# Patient Record
Sex: FEMALE | Race: WHITE | NOT HISPANIC OR LATINO
[De-identification: names, ages, dates, MRNs, and addresses within clinical notes are randomized per-mention and may not be internally consistent; named-entity substitution may affect disease eponyms.]

---

## 2017-01-16 ENCOUNTER — RESULT CHARGE (OUTPATIENT)
Age: 52
End: 2017-01-16

## 2017-01-17 ENCOUNTER — APPOINTMENT (OUTPATIENT)
Dept: ENDOCRINOLOGY | Facility: CLINIC | Age: 52
End: 2017-01-17

## 2017-01-17 VITALS
SYSTOLIC BLOOD PRESSURE: 140 MMHG | DIASTOLIC BLOOD PRESSURE: 78 MMHG | OXYGEN SATURATION: 97 % | HEIGHT: 68 IN | BODY MASS INDEX: 44.41 KG/M2 | HEART RATE: 79 BPM | WEIGHT: 293 LBS

## 2017-01-17 DIAGNOSIS — Z82.49 FAMILY HISTORY OF ISCHEMIC HEART DISEASE AND OTHER DISEASES OF THE CIRCULATORY SYSTEM: ICD-10-CM

## 2017-01-17 DIAGNOSIS — Z83.3 FAMILY HISTORY OF DIABETES MELLITUS: ICD-10-CM

## 2017-01-17 DIAGNOSIS — Z82.61 FAMILY HISTORY OF ARTHRITIS: ICD-10-CM

## 2017-01-17 DIAGNOSIS — F17.200 NICOTINE DEPENDENCE, UNSPECIFIED, UNCOMPLICATED: ICD-10-CM

## 2017-01-17 DIAGNOSIS — Z80.0 FAMILY HISTORY OF MALIGNANT NEOPLASM OF DIGESTIVE ORGANS: ICD-10-CM

## 2017-01-17 LAB — GLUCOSE BLDC GLUCOMTR-MCNC: 138

## 2017-01-18 LAB
ANION GAP SERPL CALC-SCNC: 17 MMOL/L
BUN SERPL-MCNC: 18 MG/DL
CALCIUM SERPL-MCNC: 9.8 MG/DL
CHLORIDE SERPL-SCNC: 96 MMOL/L
CHOLEST SERPL-MCNC: 234 MG/DL
CHOLEST/HDLC SERPL: 5.4 RATIO
CO2 SERPL-SCNC: 21 MMOL/L
CREAT SERPL-MCNC: 0.57 MG/DL
GLUCOSE SERPL-MCNC: 142 MG/DL
HDLC SERPL-MCNC: 43 MG/DL
LDLC SERPL CALC-MCNC: 114 MG/DL
POTASSIUM SERPL-SCNC: 4.6 MMOL/L
SODIUM SERPL-SCNC: 134 MMOL/L
TRIGL SERPL-MCNC: 384 MG/DL
TSH SERPL-ACNC: 3.4 UIU/ML

## 2017-01-19 LAB — HBA1C MFR BLD HPLC: 7.7

## 2017-02-06 ENCOUNTER — RESULT REVIEW (OUTPATIENT)
Age: 52
End: 2017-02-06

## 2017-02-10 ENCOUNTER — APPOINTMENT (OUTPATIENT)
Dept: OBGYN | Facility: CLINIC | Age: 52
End: 2017-02-10

## 2017-04-12 ENCOUNTER — APPOINTMENT (OUTPATIENT)
Dept: ENDOCRINOLOGY | Facility: CLINIC | Age: 52
End: 2017-04-12

## 2017-04-19 ENCOUNTER — APPOINTMENT (OUTPATIENT)
Dept: ENDOCRINOLOGY | Facility: CLINIC | Age: 52
End: 2017-04-19

## 2018-03-15 ENCOUNTER — ASOB RESULT (OUTPATIENT)
Age: 53
End: 2018-03-15

## 2018-03-15 ENCOUNTER — APPOINTMENT (OUTPATIENT)
Dept: OBGYN | Facility: CLINIC | Age: 53
End: 2018-03-15
Payer: COMMERCIAL

## 2018-03-15 PROCEDURE — 76830 TRANSVAGINAL US NON-OB: CPT

## 2018-05-24 ENCOUNTER — RESULT REVIEW (OUTPATIENT)
Age: 53
End: 2018-05-24

## 2019-02-25 ENCOUNTER — APPOINTMENT (OUTPATIENT)
Dept: SPINE | Facility: CLINIC | Age: 54
End: 2019-02-25
Payer: OTHER MISCELLANEOUS

## 2019-02-25 VITALS
HEART RATE: 92 BPM | WEIGHT: 273 LBS | HEIGHT: 68 IN | OXYGEN SATURATION: 95 % | BODY MASS INDEX: 41.37 KG/M2 | DIASTOLIC BLOOD PRESSURE: 73 MMHG | SYSTOLIC BLOOD PRESSURE: 115 MMHG | RESPIRATION RATE: 15 BRPM

## 2019-02-25 DIAGNOSIS — M54.16 RADICULOPATHY, LUMBAR REGION: ICD-10-CM

## 2019-02-25 DIAGNOSIS — Z83.49 FAMILY HISTORY OF OTHER ENDOCRINE, NUTRITIONAL AND METABOLIC DISEASES: ICD-10-CM

## 2019-02-25 DIAGNOSIS — Z87.39 PERSONAL HISTORY OF OTHER DISEASES OF THE MUSCULOSKELETAL SYSTEM AND CONNECTIVE TISSUE: ICD-10-CM

## 2019-02-25 DIAGNOSIS — M54.9 DORSALGIA, UNSPECIFIED: ICD-10-CM

## 2019-02-25 PROCEDURE — 99203 OFFICE O/P NEW LOW 30 MIN: CPT

## 2019-02-25 RX ORDER — PSYLLIUM HUSK 0.4 G
CAPSULE ORAL
Refills: 0 | Status: ACTIVE | COMMUNITY

## 2019-02-25 RX ORDER — CALCIUM CARBONATE 260MG(650)
TABLET,CHEWABLE ORAL
Refills: 0 | Status: ACTIVE | COMMUNITY

## 2019-02-25 RX ORDER — OMEGA-3/DHA/EPA/FISH OIL 300-1000MG
CAPSULE ORAL
Refills: 0 | Status: ACTIVE | COMMUNITY

## 2019-02-25 RX ORDER — ATORVASTATIN CALCIUM 10 MG/1
10 TABLET, FILM COATED ORAL
Refills: 0 | Status: ACTIVE | COMMUNITY

## 2019-02-25 RX ORDER — MULTIVIT-MIN/IRON/FOLIC ACID/K 18-600-40
500 CAPSULE ORAL
Refills: 0 | Status: ACTIVE | COMMUNITY

## 2019-02-25 RX ORDER — BENZOCAINE/BENZALKONIUM/ALOE/E 5 %-0.13 %
CREAM (GRAM) TOPICAL
Refills: 0 | Status: ACTIVE | COMMUNITY

## 2019-02-25 NOTE — REASON FOR VISIT
[New Patient Visit] : a new patient visit [Referred By: _________] : Patient was referred by ANA ROSA [FreeTextEntry1] : Patient reports many years of low back pain

## 2019-02-25 NOTE — HISTORY OF PRESENT ILLNESS
[> 3 months] : more  than 3 months [FreeTextEntry1] : back and leg pain [de-identified] : 53-year-old woman who was originally injured at work many years ago. She has an MRI from 2012 showed a small central L5-S1 disc herniation without significant neural impingement.  She has been undergoing chiropractic treatment with some improvement. She is also been taking Motrin but this recently up to her stomach so she is not taking. Recent lumbar spine x-rays show mild degenerative changes no problems with alignment. She does not describe any weakness, bowel or bladder dysfunction.Her BMI is 41 and she has recently lost 24pounds.

## 2019-02-25 NOTE — ASSESSMENT
[FreeTextEntry1] : Work-related injury resulting in chronic back and leg pain. Normal neurological exam. Recommend physical therapy and chiropractic treatment. Return in 2 months. Also recommend continued weight loss. No need for any surgical intervention at the present time.

## 2019-02-25 NOTE — CONSULT LETTER
[Dear  ___] : Dear  [unfilled], [Consult Letter:] : I had the pleasure of evaluating your patient, [unfilled]. [Please see my note below.] : Please see my note below. [Consult Closing:] : Thank you very much for allowing me to participate in the care of this patient.  If you have any questions, please do not hesitate to contact me. [Sincerely,] : Sincerely, [FreeTextEntry3] : Hank Sheets MD\par Chief of Neurosurgery [facility]\par [facility]\par

## 2019-02-25 NOTE — PHYSICAL EXAM
[Person] : oriented to person [Place] : oriented to place [Time] : oriented to time [Short Term Intact] : short term memory intact [Remote Intact] : remote memory intact [Span Intact] : the attention span was normal [Concentration Intact] : normal concentrating ability [Fluency] : fluency intact [Comprehension] : comprehension intact [Current Events] : adequate knowledge of current events [Past History] : adequate knowledge of personal past history [Vocabulary] : adequate range of vocabulary [Cranial Nerves Optic (II)] : visual acuity intact bilaterally,  pupils equal round and reactive to light [Cranial Nerves Oculomotor (III)] : extraocular motion intact [Cranial Nerves Trigeminal (V)] : facial sensation intact symmetrically [Cranial Nerves Facial (VII)] : face symmetrical [Cranial Nerves Vestibulocochlear (VIII)] : hearing was intact bilaterally [Cranial Nerves Glossopharyngeal (IX)] : tongue and palate midline [Cranial Nerves Accessory (XI - Cranial And Spinal)] : head turning and shoulder shrug symmetric [Cranial Nerves Hypoglossal (XII)] : there was no tongue deviation with protrusion [Motor Tone] : muscle tone was normal in all four extremities [Motor Strength] : muscle strength was normal in all four extremities [No Muscle Atrophy] : normal bulk in all four extremities [Sensation Tactile Decrease] : light touch was intact [Abnormal Walk] : normal gait [Balance] : balance was intact [Past-pointing] : there was no past-pointing [Tremor] : no tremor present [2+] : Ankle jerk left 2+ [Plantar Reflex Right Only] : normal on the right [Plantar Reflex Left Only] : normal on the left [Winter] : Winter's sign was not demonstrated [No Tenderness to Palpation] : no spine tenderness on palpation [Straight-Leg Raise Test - Left] : straight leg raise of the left leg was negative [Straight-Leg Raise Test - Right] : straight leg raise  of the right leg was negative

## 2019-06-05 ENCOUNTER — APPOINTMENT (OUTPATIENT)
Dept: ENDOCRINOLOGY | Facility: CLINIC | Age: 54
End: 2019-06-05
Payer: COMMERCIAL

## 2019-06-05 VITALS
TEMPERATURE: 98.5 F | OXYGEN SATURATION: 98 % | BODY MASS INDEX: 43.5 KG/M2 | SYSTOLIC BLOOD PRESSURE: 115 MMHG | HEIGHT: 68 IN | DIASTOLIC BLOOD PRESSURE: 80 MMHG | WEIGHT: 287 LBS | HEART RATE: 96 BPM

## 2019-06-05 DIAGNOSIS — E11.628 TYPE 2 DIABETES MELLITUS WITH OTHER SKIN COMPLICATIONS: ICD-10-CM

## 2019-06-05 PROCEDURE — 99204 OFFICE O/P NEW MOD 45 MIN: CPT

## 2019-06-05 PROCEDURE — 82962 GLUCOSE BLOOD TEST: CPT

## 2019-06-05 PROCEDURE — 83036 HEMOGLOBIN GLYCOSYLATED A1C: CPT | Mod: QW

## 2019-06-06 ENCOUNTER — RX RENEWAL (OUTPATIENT)
Age: 54
End: 2019-06-06

## 2019-06-17 LAB
GLUCOSE BLDC GLUCOMTR-MCNC: 239
HBA1C MFR BLD HPLC: 8.7

## 2019-07-01 ENCOUNTER — APPOINTMENT (OUTPATIENT)
Dept: ENDOCRINOLOGY | Facility: CLINIC | Age: 54
End: 2019-07-01
Payer: COMMERCIAL

## 2019-07-01 VITALS
TEMPERATURE: 98.7 F | HEART RATE: 99 BPM | OXYGEN SATURATION: 96 % | DIASTOLIC BLOOD PRESSURE: 70 MMHG | WEIGHT: 285 LBS | BODY MASS INDEX: 43.19 KG/M2 | HEIGHT: 68 IN | SYSTOLIC BLOOD PRESSURE: 118 MMHG

## 2019-07-01 PROCEDURE — 82962 GLUCOSE BLOOD TEST: CPT

## 2019-07-01 PROCEDURE — 99214 OFFICE O/P EST MOD 30 MIN: CPT

## 2019-07-02 RX ORDER — FLASH GLUCOSE SENSOR
KIT MISCELLANEOUS
Qty: 6 | Refills: 3 | Status: ACTIVE | COMMUNITY
Start: 2019-07-01 | End: 1900-01-01

## 2019-07-02 RX ORDER — PHENTERMINE HYDROCHLORIDE 15 MG/1
15 CAPSULE ORAL DAILY
Qty: 30 | Refills: 0 | Status: DISCONTINUED | COMMUNITY
Start: 2019-06-05 | End: 2019-07-02

## 2019-07-03 RX ORDER — FLASH GLUCOSE SCANNING READER
EACH MISCELLANEOUS
Qty: 1 | Refills: 0 | Status: ACTIVE | COMMUNITY
Start: 2019-07-01 | End: 1900-01-01

## 2019-07-24 NOTE — PHYSICAL EXAM
[Alert] : alert [Well Nourished] : well nourished [No Acute Distress] : no acute distress [Well Developed] : well developed [Normal Sclera/Conjunctiva] : normal sclera/conjunctiva [No Proptosis] : no proptosis [EOMI] : extra ocular movement intact [Normal Oropharynx] : the oropharynx was normal [Thyroid Not Enlarged] : the thyroid was not enlarged [No Thyroid Nodules] : there were no palpable thyroid nodules [No Respiratory Distress] : no respiratory distress [No Accessory Muscle Use] : no accessory muscle use [Clear to Auscultation] : lungs were clear to auscultation bilaterally [Normal S1, S2] : normal S1 and S2 [Normal Rate] : heart rate was normal  [Regular Rhythm] : with a regular rhythm [Pedal Pulses Normal] : the pedal pulses are present [No Edema] : there was no peripheral edema [Normal Bowel Sounds] : normal bowel sounds [Not Tender] : non-tender [Not Distended] : not distended [Soft] : abdomen soft [Post Cervical Nodes] : posterior cervical nodes [Anterior Cervical Nodes] : anterior cervical nodes [Axillary Nodes] : axillary nodes [No Spinal Tenderness] : no spinal tenderness [Normal] : normal and non tender [Normal Gait] : normal gait [Spine Straight] : spine straight [No Stigmata of Cushings Syndrome] : no stigmata of cushings syndrome [No Rash] : no rash [Normal Strength/Tone] : muscle strength and tone were normal [Normal Reflexes] : deep tendon reflexes were 2+ and symmetric [No Tremors] : no tremors [Oriented x3] : oriented to person, place, and time [Acanthosis Nigricans] : no acanthosis nigricans

## 2019-07-24 NOTE — HISTORY OF PRESENT ILLNESS
[FreeTextEntry1] : Ms. Lopez is a  53  year-old female who presents for endocrine evaluation with regard to a hx of type 2 dm. The diabetes has been present for about 4-5   years.  . She denies any history of neuropathy or nephropathy. With regard to neuropathy, she denies any s/s. . For the diabetes, she is currently taking Metfromin Wer 750 mg 2 per day Recently admits to non compliance with diet and has not been testing .Did have charles but needs someone to help her put it on.  She denies polyuria, polydipsia, or any visual changes. She too denies any skin lesions, skin breakdown or non-healing areas of skin. She further denies any podiatric concerns. Ophthalmologic evaluation is up to date. A1c values over last several yrs have been in th e mid  to upper 7 range. Home glucose monitoring has shown values in am to be   and values   .  She does deny any hypoglycemia or hypoglycemic signs or symptoms.\par She is on Lisinopril 10 mg and Lipitor 10 mg. The Lisinopril is said to be for renal protection. Has considered bariatric surgery but doesn’t want to go forward.\par Had been on Tanzeum injection in the past.\par Does want to consider appetite suppressant.\par Recent lasb with A1c 8.6 and Ttrig 436. and vit d wqw 17\par Too, had protein in urine on u/a.  nahidlows with Dr. Cain nephrologist.\par \par

## 2019-07-25 ENCOUNTER — APPOINTMENT (OUTPATIENT)
Dept: OTOLARYNGOLOGY | Facility: CLINIC | Age: 54
End: 2019-07-25
Payer: COMMERCIAL

## 2019-07-25 VITALS
HEART RATE: 101 BPM | DIASTOLIC BLOOD PRESSURE: 70 MMHG | BODY MASS INDEX: 43.19 KG/M2 | HEIGHT: 68 IN | SYSTOLIC BLOOD PRESSURE: 102 MMHG | WEIGHT: 285 LBS

## 2019-07-25 DIAGNOSIS — Z86.79 PERSONAL HISTORY OF OTHER DISEASES OF THE CIRCULATORY SYSTEM: ICD-10-CM

## 2019-07-25 DIAGNOSIS — R42 DIZZINESS AND GIDDINESS: ICD-10-CM

## 2019-07-25 DIAGNOSIS — R51 HEADACHE: ICD-10-CM

## 2019-07-25 DIAGNOSIS — Z86.39 PERSONAL HISTORY OF OTHER ENDOCRINE, NUTRITIONAL AND METABOLIC DISEASE: ICD-10-CM

## 2019-07-25 PROCEDURE — 92557 COMPREHENSIVE HEARING TEST: CPT

## 2019-07-25 PROCEDURE — 92567 TYMPANOMETRY: CPT

## 2019-07-25 PROCEDURE — 99204 OFFICE O/P NEW MOD 45 MIN: CPT | Mod: 25

## 2019-07-25 NOTE — REVIEW OF SYSTEMS
[Sneezing] : sneezing [Seasonal Allergies] : seasonal allergies [Lightheadedness] : lightheadedness [Problem Snoring] : problem snoring [Snoring With Pauses] : snoring with pauses [Red Eyes] : red eyes [Joint Pain] : joint pain [Negative] : Heme/Lymph

## 2019-07-26 PROBLEM — R51 HEADACHE: Status: ACTIVE | Noted: 2019-07-26

## 2019-07-26 NOTE — REASON FOR VISIT
[Initial Evaluation] : an initial evaluation for [FreeTextEntry2] : patient complaint of going off balance and light headache times 3 weeks ago

## 2019-07-26 NOTE — PHYSICAL EXAM
[Hearing Loss Right Only] : normal [Hearing Loss Left Only] : normal [Rinne Test Air Conduction Persists > Bone Conduction Right] : air conduction greater than bone conduction on the right [Rinne Test Air Conduction Persists > Bone Conduction Left] : air conduction greater than bone conduction on the left [Hearing Tavares Test (Tuning Fork On Forehead)] : no lateralization of tone [Nystagmus] : ~T no ~M nystagmus was seen [Fukuda Step Test] : Fukuda Step Test was Negative [Romberg's Sign] : Romberg's sign was absent [Fistula Sign] : Fistula Sign: Negative [Past-Pointing] : Past-Pointing: Negative [Tiffanie-Halldayanake] : Selah-Hallpike: Negative [de-identified] : neg head thrust [Midline] : trachea located in midline position [Normal] : no rashes

## 2019-07-26 NOTE — HISTORY OF PRESENT ILLNESS
[de-identified] : 52yo woman with episodic vertigo\par describes as the whole world moving\par or feels like she is moving\par less than a minute\par occurs with standing, sitting, moving, any time\par 3-4 times per week\par going on for several months\par she is in menopause\par history of migraines\par +motion sickness\par +fam h/o migraines\par was taking an mg supplement, stopped at time sx started\par no problems with hearing

## 2019-07-28 LAB
CREAT SPEC-SCNC: 74 MG/DL
GLUCOSE BLDC GLUCOMTR-MCNC: 177
MICROALBUMIN 24H UR DL<=1MG/L-MCNC: 99.1 MG/DL
MICROALBUMIN/CREAT 24H UR-RTO: 1342 MG/G

## 2019-07-28 NOTE — PHYSICAL EXAM
[Alert] : alert [No Acute Distress] : no acute distress [Well Nourished] : well nourished [Well Developed] : well developed [Normal Sclera/Conjunctiva] : normal sclera/conjunctiva [EOMI] : extra ocular movement intact [Normal Oropharynx] : the oropharynx was normal [No Proptosis] : no proptosis [Thyroid Not Enlarged] : the thyroid was not enlarged [No Respiratory Distress] : no respiratory distress [No Thyroid Nodules] : there were no palpable thyroid nodules [No Accessory Muscle Use] : no accessory muscle use [Clear to Auscultation] : lungs were clear to auscultation bilaterally [Normal Rate] : heart rate was normal  [Normal S1, S2] : normal S1 and S2 [Regular Rhythm] : with a regular rhythm [Pedal Pulses Normal] : the pedal pulses are present [No Edema] : there was no peripheral edema [Normal Bowel Sounds] : normal bowel sounds [Not Tender] : non-tender [Soft] : abdomen soft [Not Distended] : not distended [Post Cervical Nodes] : posterior cervical nodes [Anterior Cervical Nodes] : anterior cervical nodes [Normal] : normal and non tender [Axillary Nodes] : axillary nodes [No Spinal Tenderness] : no spinal tenderness [Spine Straight] : spine straight [No Stigmata of Cushings Syndrome] : no stigmata of cushings syndrome [Normal Gait] : normal gait [Normal Strength/Tone] : muscle strength and tone were normal [No Rash] : no rash [No Tremors] : no tremors [Normal Reflexes] : deep tendon reflexes were 2+ and symmetric [Oriented x3] : oriented to person, place, and time [Acanthosis Nigricans] : no acanthosis nigricans

## 2019-07-28 NOTE — HISTORY OF PRESENT ILLNESS
[FreeTextEntry1] : Ms. Lopez is a  53  year-old female who presents for endocrine evaluation with regard to a hx of type 2 dm. The diabetes has been present for about    years.  The diabetes has been present for about 4-5 yrs. She denies any history of neuropathy or nephropathy. With regard to neuropathy, she denies any s/s. . For the diabetes, she is currently taking janument 50/1000 0mg  p[er Dr. Corrales  and is off metformin  too on phentermine  was taking 2 per day total of 30 nmg  2 lb wt loss.\par She took the phentermine but did not notice  any change in her appetite.\par Very stressed of late.\par Recent,admits to non compliance with diet and has not been testing .Did have charles but needs someone to help her put it on.  She denies polyuria, polydipsia, or any visual changes. She too denies any skin lesions, skin breakdown or non-healing areas of skin. She further denies any podiatric concerns. Ophthalmologic evaluation is up to date. A1c values over last several yrs have been in the  mid  to upper 7 range. Home glucose monitoring has shown values in am to be in mid  to low 100's to low 200's  She does deny any hypoglycemia or hypoglycemic signs or symptoms.\par She is on Lisinopril 10 mg and Lipitor 10 mg. The Lisinopril is said to be for renal protection. Has considered bariatric surgery but doesn’t want to go forward.\par Had been on Tanzeum injection in the past.\par Does want to consider appetite suppressant.\par Recent labs with A1c 8.6 and Ttrig 436. and vit d at 17\par Too, had protein in urine on u/a.  follows with Dr. Chaidez  nephrologist.\par \par

## 2019-08-02 ENCOUNTER — APPOINTMENT (OUTPATIENT)
Dept: ENDOCRINOLOGY | Facility: CLINIC | Age: 54
End: 2019-08-02
Payer: COMMERCIAL

## 2019-08-12 ENCOUNTER — APPOINTMENT (OUTPATIENT)
Dept: ENDOCRINOLOGY | Facility: CLINIC | Age: 54
End: 2019-08-12
Payer: COMMERCIAL

## 2019-08-12 VITALS
TEMPERATURE: 98.5 F | HEART RATE: 98 BPM | OXYGEN SATURATION: 95 % | BODY MASS INDEX: 43.19 KG/M2 | SYSTOLIC BLOOD PRESSURE: 120 MMHG | WEIGHT: 285 LBS | HEIGHT: 68 IN | DIASTOLIC BLOOD PRESSURE: 84 MMHG

## 2019-08-12 VITALS
HEIGHT: 68 IN | TEMPERATURE: 98.5 F | BODY MASS INDEX: 42.74 KG/M2 | RESPIRATION RATE: 15 BRPM | DIASTOLIC BLOOD PRESSURE: 84 MMHG | HEART RATE: 98 BPM | SYSTOLIC BLOOD PRESSURE: 120 MMHG | WEIGHT: 282 LBS

## 2019-08-12 LAB — GLUCOSE BLDC GLUCOMTR-MCNC: 178

## 2019-08-12 PROCEDURE — 82962 GLUCOSE BLOOD TEST: CPT

## 2019-08-12 PROCEDURE — 99215 OFFICE O/P EST HI 40 MIN: CPT

## 2019-08-12 RX ORDER — METFORMIN HYDROCHLORIDE 625 MG/1
TABLET ORAL
Refills: 0 | Status: DISCONTINUED | COMMUNITY
End: 2019-08-12

## 2019-08-12 RX ORDER — ALBIGLUTIDE 30 MG/.5ML
30 INJECTION, POWDER, LYOPHILIZED, FOR SOLUTION SUBCUTANEOUS
Qty: 1 | Refills: 0 | Status: DISCONTINUED | COMMUNITY
Start: 2017-01-17 | End: 2019-08-12

## 2019-08-12 RX ORDER — LANCETS
EACH MISCELLANEOUS
Qty: 3 | Refills: 3 | Status: ACTIVE | COMMUNITY
Start: 2019-08-12 | End: 1900-01-01

## 2019-08-12 NOTE — PHYSICAL EXAM
[Alert] : alert [No Acute Distress] : no acute distress [Well Nourished] : well nourished [Well Developed] : well developed [Normal Sclera/Conjunctiva] : normal sclera/conjunctiva [EOMI] : extra ocular movement intact [No Proptosis] : no proptosis [Thyroid Not Enlarged] : the thyroid was not enlarged [Normal Oropharynx] : the oropharynx was normal [No Thyroid Nodules] : there were no palpable thyroid nodules [No Respiratory Distress] : no respiratory distress [No Accessory Muscle Use] : no accessory muscle use [Clear to Auscultation] : lungs were clear to auscultation bilaterally [Normal Rate] : heart rate was normal  [Normal S1, S2] : normal S1 and S2 [Regular Rhythm] : with a regular rhythm [Pedal Pulses Normal] : the pedal pulses are present [No Edema] : there was no peripheral edema [Normal Bowel Sounds] : normal bowel sounds [Not Tender] : non-tender [Soft] : abdomen soft [Not Distended] : not distended [Anterior Cervical Nodes] : anterior cervical nodes [Post Cervical Nodes] : posterior cervical nodes [Axillary Nodes] : axillary nodes [Normal] : normal and non tender [No Spinal Tenderness] : no spinal tenderness [Spine Straight] : spine straight [No Stigmata of Cushings Syndrome] : no stigmata of cushings syndrome [Normal Gait] : normal gait [Normal Strength/Tone] : muscle strength and tone were normal [No Rash] : no rash [Normal Reflexes] : deep tendon reflexes were 2+ and symmetric [No Tremors] : no tremors [Oriented x3] : oriented to person, place, and time

## 2019-08-12 NOTE — HISTORY OF PRESENT ILLNESS
[FreeTextEntry1] : Ms. Lopez is a  53  year-old female who returns for endocrine reevaluation. She returns with with regard to a hx of type 2 dm. .  The diabetes has been present for about 4-5 yrs. She denies any history of neuropathy or nephropathy. With regard to neuropathy, she denies any s/s. . For the diabetes, she is currently taking janument 50/1000  Too on phentermine  was taking 2 per day total of 30 nmg  2 lb wt loss.\par She took the phentermine but did not notice  any change in her appetite.\par Very stressed of late.\par Recent,admits to non compliance with diet and has not been testing .Did have charles but needs someone to help her put it on.  She denies polyuria, polydipsia, or any visual changes. She too denies any skin lesions, skin breakdown or non-healing areas of skin. She further denies any podiatric concerns. Ophthalmologic evaluation is up to date. A1c values over last several yrs have been in the  mid  to upper 7 range. Home glucose monitoring has shown values in am to be in mid  to low 100's to low 200's  She does deny any hypoglycemia or hypoglycemic signs or symptoms.\par She is on Lisinopril 10 mg and Lipitor 10 mg. The Lisinopril is said to be for renal protection. Has considered bariatric surgery but doesn’t want to go forward.\par Had been on Tanzeum injection in the past.\par Does want to consider appetite suppressant.\par Recent labs with A1c 8.6 and Ttrig 436. and vit d at 17\par Too, had protein in urine on u/a.  follows with Dr. Chaidez  nephrologist.\par \par

## 2019-08-12 NOTE — HISTORY OF PRESENT ILLNESS
[FreeTextEntry1] : Ms. Lopez is a  53  year-old female who returns for endocrine reevaluation. She returns with with regard to a hx of type 2 dm. .  The diabetes has been present for about 4-5 yrs. She denies any history of neuropathy or nephropathy. With regard to neuropathy, she denies any s/s. . For the diabetes, she is currently taking Janumet 50/1000  Too on phentermine now 30 mg.\par She is on the 30 mg Phentermine and still not great effect on appetite and lost 2-3 lbs.\par \par Recent,admits to non compliance with diet and has not been testing .Now states ? Lobo costr was $ 100.00 She denies polyuria, polydipsia, or any visual changes. She too denies any skin lesions, skin breakdown or non-healing areas of skin. She further denies any podiatric concerns. Ophthalmologic evaluation is up to date. A1c values over last several yrs have been in the  mid  to upper 7 range.   She notes occasional notes sone low bg s/s which do remit after bg intake.\par She is on Lisinopril 10 mg and Lipitor 10 mg. The Lisinopril is said to be for renal protection. Has considered bariatric surgery but doesn’t want to go forward.\par Recent labs with A1c 8.6 and Ttrig 436. and vit d at 17\par Too, had protein in urine on u/a.  follows with Dr. Chaidez  nephrologist.\par Too , does have hx of fatty.\par \par

## 2019-08-13 ENCOUNTER — APPOINTMENT (OUTPATIENT)
Dept: CARDIOLOGY | Facility: CLINIC | Age: 54
End: 2019-08-13
Payer: COMMERCIAL

## 2019-08-13 PROCEDURE — 93880 EXTRACRANIAL BILAT STUDY: CPT

## 2019-08-13 PROCEDURE — 93925 LOWER EXTREMITY STUDY: CPT

## 2019-08-14 ENCOUNTER — APPOINTMENT (OUTPATIENT)
Dept: OTOLARYNGOLOGY | Facility: CLINIC | Age: 54
End: 2019-08-14
Payer: COMMERCIAL

## 2019-08-14 PROCEDURE — 92547 SUPPLEMENTAL ELECTRICAL TEST: CPT

## 2019-08-14 PROCEDURE — 92537 CALORIC VSTBLR TEST W/REC: CPT

## 2019-08-14 PROCEDURE — 92700A: CUSTOM

## 2019-08-14 PROCEDURE — 92540 BASIC VESTIBULAR EVALUATION: CPT

## 2019-08-14 PROCEDURE — 92567 TYMPANOMETRY: CPT

## 2019-09-01 ENCOUNTER — EMERGENCY (EMERGENCY)
Facility: HOSPITAL | Age: 54
LOS: 1 days | Discharge: HOME | End: 2019-09-01
Attending: EMERGENCY MEDICINE
Payer: COMMERCIAL

## 2019-09-01 VITALS
OXYGEN SATURATION: 96 % | DIASTOLIC BLOOD PRESSURE: 83 MMHG | SYSTOLIC BLOOD PRESSURE: 134 MMHG | HEART RATE: 97 BPM | RESPIRATION RATE: 17 BRPM | HEIGHT: 69 IN | WEIGHT: 279.99 LBS | TEMPERATURE: 98 F

## 2019-09-01 PROCEDURE — 99284 EMERGENCY DEPT VISIT MOD MDM: CPT

## 2019-09-01 PROCEDURE — 73502 X-RAY EXAM HIP UNI 2-3 VIEWS: CPT | Mod: 26,LT

## 2019-09-01 PROCEDURE — 73610 X-RAY EXAM OF ANKLE: CPT | Mod: 26,LT

## 2019-09-01 PROCEDURE — 73502 X-RAY EXAM HIP UNI 2-3 VIEWS: CPT

## 2019-09-01 PROCEDURE — 73610 X-RAY EXAM OF ANKLE: CPT

## 2019-09-01 PROCEDURE — 99283 EMERGENCY DEPT VISIT LOW MDM: CPT

## 2019-09-01 RX ORDER — IBUPROFEN 200 MG
600 TABLET ORAL ONCE
Refills: 0 | Status: COMPLETED | OUTPATIENT
Start: 2019-09-01 | End: 2019-09-01

## 2019-09-01 RX ADMIN — Medication 600 MILLIGRAM(S): at 20:04

## 2019-09-01 NOTE — ED PROVIDER NOTE - PATIENT PORTAL LINK FT
You can access the FollowMyHealth Patient Portal offered by [facility] by registering at the following website: [email or web address]. By joining [employer]’s FollowMyHealth portal, you will also be able to view your health information using other applications (apps) compatible with our system.

## 2019-09-01 NOTE — ED PROVIDER NOTE - PSH
cholecystectomy  1999  Delivery By Elective Caesarean Section  1994, 1998  Finger injury  Cross finger flap left index finger 2012  right salpingo-oophorectomy  2006

## 2019-09-01 NOTE — ED PROVIDER NOTE - ATTENDING CONTRIBUTION TO CARE
54F    PMHx & PSHx: reviewed in EMR  SocHx: Denies ETOH/drugs/smoking  Allergies: NKDA    PHYSICAL EXAMINATION:  VITALS REVIEWED.  VS slightly hypertensive, otherwise normal  GENERALIZED APPEARANCE:  Comfortable, no acute distress, ambulating without difficulty  SKIN:  Warm, dry, no cyanosis  HEAD:  No obvious scalp lesions  EYES:  Conjunctiva pink, no icterus  ENMT:  Mucus membranes moist, no stridor  NECK:  Supple, non-tender  CHEST AND RESPIRATORY:  Clear to auscultation B/L, good air entry B/L, equal chest expansion  HEART AND CARDIOVASCULAR:  Regular rate, no obvious murmur  ABDOMEN AND GI:  Soft, non-tender, non-distended.  No rebound, no guarding  EXTREMITIES:   NEURO: AAOx3, gross motor and sensory intact    Impression:  L hip pain, L ankle pain s/p fall, r/o Fx 54F s/p trip and fall accidental, about 3-4 days prior to arrival to ED. States that she has pain to the L side hip, non-radiating, exacerbated by walking, alleviated with rest, as well L ankle pain, L lateral side, non-radiating, exacerbated by walking, alleviated with rest. States that she has not taken anything for pain. Denies head injury/LOC with fall. Has been ambulating without difficulty.    ROS:  GEN: (-) fevers/chills, (-) weight loss, (-) fatigue/malaise  HEENT: (-) visual change  NECK: (-) stiffness, (-) swelling  RESP: (-) shortness of breath, (-) cough, (-) sputum  CV: (-) chest pain, (-) palpitations  EXT: (-) edema, (-) cyanosis, (+) L ankle pain  ENDO: (-) heat/cold intolerance, (-) polyuria  NEURO: (-) paresthesias, (-) weakness, (-) headache, (-) dizziness, (-) syncope  MSK: (+) fall, (+) hip pain    PMHx & PSHx: reviewed in EMR  SocHx: Denies ETOH/drugs/smoking  Allergies: NKDA    PHYSICAL EXAMINATION:  VITALS REVIEWED.  VS slightly hypertensive, otherwise normal  GENERALIZED APPEARANCE:  Comfortable, no acute distress, ambulating without difficulty  SKIN:  Warm, dry, no cyanosis  HEAD:  No obvious scalp lesions  EYES:  Conjunctiva pink, no icterus  ENMT:  Mucus membranes moist, no stridor  NECK:  Supple, non-tender  CHEST AND RESPIRATORY:  Clear to auscultation B/L, good air entry B/L, equal chest expansion  HEART AND CARDIOVASCULAR:  Regular rate, no obvious murmur  ABDOMEN AND GI:  Soft, non-tender, non-distended.  No rebound, no guarding  EXTREMITIES: L hip slight tenderness laterally, leg without external rotation/shortening, compartments soft, L ankle mild lateral malleoli tenderness, ROM of ankle intact, DP/PT pulses 2+  NEURO: AAOx3, gross motor and sensory intact    Impression:  L hip pain, L ankle pain s/p fall, r/o Fx, ?sprain; ambulating so doubt hip fx  XR, pain mangement, re-eval, f/u with PMD/Ortho

## 2019-09-01 NOTE — ED PROVIDER NOTE - NSFOLLOWUPCLINICS_GEN_ALL_ED_FT
[facility]  Orthopedic Surgery  [address], 3rd & 4th [place] [address]  Phone: [phone]  Fax:   Follow Up Time: 7-10 Days    [facility]  Orthopedic Surgery  [address] [place] [id number]  Phone: [phone]  Fax:   Follow Up Time: 7-10 Days

## 2019-09-01 NOTE — ED ADULT TRIAGE NOTE - CHIEF COMPLAINT QUOTE
pt s/p trip and mechanical fall onto entire L side.  pt c/o pain that initiates at this neck and radiates down that L side

## 2019-09-01 NOTE — ED ADULT NURSE NOTE - OBJECTIVE STATEMENT
54F aaox4 ambulatory presented to triage with c/o  s/p trip and mechanical fall onto entire L side.  pt c/o pain that initiates at this neck and radiates down that L side. No signs of deformities noted. VS wnl.

## 2019-09-01 NOTE — ED PROVIDER NOTE - PMH
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

## 2019-09-17 ENCOUNTER — APPOINTMENT (OUTPATIENT)
Dept: OTOLARYNGOLOGY | Facility: CLINIC | Age: 54
End: 2019-09-17

## 2020-06-04 NOTE — ED PROVIDER NOTE - NSFOLLOWUPINSTRUCTIONS_ED_ALL_ED_FT
Patient is in the supine position.   The body was positioned using the following devices: safety strap.  Procedure performed on a bed/cart. You were seen in the Emergency Department for left hip pain.  1) Advance activity as tolerated.    2) Continue all previously prescribed medications as directed.    3) Follow up with an orthopedic doctor within 7 days.   4) Return to the Emergency Department for worsening or persistent symptoms, and/or ANY NEW OR CONCERNING SYMPTOMS including but not limited to changes in strength/sensation, severe left hip pain, difficulty walking, or feeling of numbness of groin, urinary/bowel incontinence.

## 2020-06-15 ENCOUNTER — APPOINTMENT (OUTPATIENT)
Dept: ENDOCRINOLOGY | Facility: CLINIC | Age: 55
End: 2020-06-15
Payer: COMMERCIAL

## 2020-06-25 ENCOUNTER — APPOINTMENT (OUTPATIENT)
Dept: ENDOCRINOLOGY | Facility: CLINIC | Age: 55
End: 2020-06-25
Payer: COMMERCIAL

## 2020-06-25 VITALS
OXYGEN SATURATION: 95 % | DIASTOLIC BLOOD PRESSURE: 69 MMHG | BODY MASS INDEX: 42.74 KG/M2 | WEIGHT: 282 LBS | RESPIRATION RATE: 16 BRPM | TEMPERATURE: 98.5 F | HEART RATE: 71 BPM | HEIGHT: 68 IN | SYSTOLIC BLOOD PRESSURE: 121 MMHG

## 2020-06-25 PROCEDURE — 83036 HEMOGLOBIN GLYCOSYLATED A1C: CPT | Mod: QW

## 2020-06-25 PROCEDURE — 82962 GLUCOSE BLOOD TEST: CPT

## 2020-06-25 PROCEDURE — 99214 OFFICE O/P EST MOD 30 MIN: CPT

## 2020-06-25 RX ORDER — PHENTERMINE HYDROCHLORIDE 30 MG/1
30 CAPSULE ORAL DAILY
Qty: 30 | Refills: 0 | Status: DISCONTINUED | COMMUNITY
Start: 2019-07-01 | End: 2020-06-25

## 2020-06-25 NOTE — PHYSICAL EXAM
[Well Nourished] : well nourished [No Acute Distress] : no acute distress [Alert] : alert [Normal Sclera/Conjunctiva] : normal sclera/conjunctiva [Well Developed] : well developed [EOMI] : extra ocular movement intact [No Proptosis] : no proptosis [No Thyroid Nodules] : no palpable thyroid nodules [Normal Oropharynx] : the oropharynx was normal [Thyroid Not Enlarged] : the thyroid was not enlarged [No Respiratory Distress] : no respiratory distress [No Accessory Muscle Use] : no accessory muscle use [Clear to Auscultation] : lungs were clear to auscultation bilaterally [Normal Rate] : heart rate was normal [Regular Rhythm] : with a regular rhythm [Normal S1, S2] : normal S1 and S2 [Pedal Pulses Normal] : the pedal pulses are present [No Edema] : no peripheral edema [Normal Bowel Sounds] : normal bowel sounds [Soft] : abdomen soft [Not Tender] : non-tender [Not Distended] : not distended [Normal Anterior Cervical Nodes] : no anterior cervical lymphadenopathy [No Spinal Tenderness] : no spinal tenderness [Normal Posterior Cervical Nodes] : no posterior cervical lymphadenopathy [No Stigmata of Cushings Syndrome] : no stigmata of Cushings Syndrome [Normal Gait] : normal gait [Spine Straight] : spine straight [Normal Strength/Tone] : muscle strength and tone were normal [No Rash] : no rash [No Tremors] : no tremors [Normal Reflexes] : deep tendon reflexes were 2+ and symmetric [Oriented x3] : oriented to person, place, and time [Acanthosis Nigricans] : no acanthosis nigricans

## 2020-06-25 NOTE — HISTORY OF PRESENT ILLNESS
[FreeTextEntry1] : Ms. Lopez is a  54 year-old female who returns for endocrine reevaluation. She returns with with regard to a hx of type 2 dm. .  The diabetes has been present for about 5 yrs. She denies any history of neuropathy or nephropathy. With regard to neuropathy, she denies any s/s. . For the diabetes, she is currently taking Metformin  2 per day.  Not testing her gucose at home.  Has charles not using. Recetn A1c 9.8%.Had rxn to [facility]\par Has pos covid Ab's di not know when she had the covid. \par  She denies polyuria, polydipsia, or any visual changes. She too denies any skin lesions, skin breakdown or non-healing areas of skin. She further denies any podiatric concerns. Ophthalmologic evaluation is up to date. A1c values over last several yrs have been in the  mid  to upper 7 range.   She notes occasional notes sone low bg s/s which do remit after bg intake.\par She is on Lisinopril 10 mg and Lipitor 10 mg. The Lisinopril is said to be for renal protection.\par Too, had protein in urine on u/a.  follows with Dr. Chaidez  nephrologist.\par Too , does have hx of fatty liver\par Labs from Dr. Corrales from 5/27 showed mild incr l LFT and A1c of 9.8 with elevated microalbumin-has followed with Dr. Medrano\par \par

## 2020-06-29 LAB
GLUCOSE BLDC GLUCOMTR-MCNC: 233
HBA1C MFR BLD HPLC: 9.9

## 2020-09-16 ENCOUNTER — APPOINTMENT (OUTPATIENT)
Dept: ENDOCRINOLOGY | Facility: CLINIC | Age: 55
End: 2020-09-16
Payer: COMMERCIAL

## 2020-09-16 VITALS
WEIGHT: 280 LBS | HEART RATE: 90 BPM | DIASTOLIC BLOOD PRESSURE: 78 MMHG | SYSTOLIC BLOOD PRESSURE: 118 MMHG | HEIGHT: 68 IN | TEMPERATURE: 97.9 F | OXYGEN SATURATION: 99 % | BODY MASS INDEX: 42.44 KG/M2

## 2020-09-16 DIAGNOSIS — R80.9 PROTEINURIA, UNSPECIFIED: ICD-10-CM

## 2020-09-16 DIAGNOSIS — E13.628 OTHER SPECIFIED DIABETES MELLITUS WITH OTHER SKIN COMPLICATIONS: ICD-10-CM

## 2020-09-16 DIAGNOSIS — K76.0 FATTY (CHANGE OF) LIVER, NOT ELSEWHERE CLASSIFIED: ICD-10-CM

## 2020-09-16 PROCEDURE — 99214 OFFICE O/P EST MOD 30 MIN: CPT | Mod: 25

## 2020-09-16 PROCEDURE — 36415 COLL VENOUS BLD VENIPUNCTURE: CPT

## 2020-09-16 PROCEDURE — 82962 GLUCOSE BLOOD TEST: CPT

## 2020-09-16 PROCEDURE — 83036 HEMOGLOBIN GLYCOSYLATED A1C: CPT | Mod: QW

## 2020-09-16 RX ORDER — FLASH GLUCOSE SENSOR
KIT MISCELLANEOUS
Qty: 6 | Refills: 3 | Status: ACTIVE | COMMUNITY
Start: 2019-08-12 | End: 1900-01-01

## 2020-09-16 RX ORDER — FLASH GLUCOSE SCANNING READER
EACH MISCELLANEOUS
Qty: 1 | Refills: 0 | Status: ACTIVE | COMMUNITY
Start: 2019-08-12 | End: 1900-01-01

## 2020-09-28 PROBLEM — R80.9 MICROALBUMINURIA: Status: ACTIVE | Noted: 2020-06-25

## 2020-09-28 PROBLEM — E13.628 DIABETES MELLITUS OF OTHER TYPE WITH SKIN COMPLICATION, WITHOUT LONG-TERM CURRENT USE OF INSULIN: Status: ACTIVE | Noted: 2017-01-17

## 2020-09-28 PROBLEM — K76.0 FATTY LIVER: Status: ACTIVE | Noted: 2019-06-05

## 2020-09-28 NOTE — PHYSICAL EXAM
[Well Nourished] : well nourished [Alert] : alert [No Acute Distress] : no acute distress [Well Developed] : well developed [Normal Sclera/Conjunctiva] : normal sclera/conjunctiva [EOMI] : extra ocular movement intact [No Proptosis] : no proptosis [Normal Oropharynx] : the oropharynx was normal [Thyroid Not Enlarged] : the thyroid was not enlarged [No Thyroid Nodules] : no palpable thyroid nodules [No Accessory Muscle Use] : no accessory muscle use [No Respiratory Distress] : no respiratory distress [Clear to Auscultation] : lungs were clear to auscultation bilaterally [Normal S1, S2] : normal S1 and S2 [Normal Rate] : heart rate was normal [Regular Rhythm] : with a regular rhythm [No Edema] : no peripheral edema [Pedal Pulses Normal] : the pedal pulses are present [Normal Bowel Sounds] : normal bowel sounds [Not Tender] : non-tender [Soft] : abdomen soft [Not Distended] : not distended [Normal Anterior Cervical Nodes] : no anterior cervical lymphadenopathy [Normal Posterior Cervical Nodes] : no posterior cervical lymphadenopathy [No Spinal Tenderness] : no spinal tenderness [Spine Straight] : spine straight [Normal Gait] : normal gait [No Stigmata of Cushings Syndrome] : no stigmata of Cushings Syndrome [Normal Strength/Tone] : muscle strength and tone were normal [No Rash] : no rash [Normal Reflexes] : deep tendon reflexes were 2+ and symmetric [No Tremors] : no tremors [Oriented x3] : oriented to person, place, and time [Acanthosis Nigricans] : no acanthosis nigricans

## 2020-09-28 NOTE — HISTORY OF PRESENT ILLNESS
[FreeTextEntry1] : Ms. Lopez is a  54 year-old female who returns for endocrine reevaluation. She returns with with regard to a hx of type 2 dm. .  The diabetes has been present for about 5 yrs. She denies any history of retinopathy or nephropathy. With regard to neuropathy, she denies any s/s. . For the diabetes, she is currently taking Metformin  2 per day.  Not testing her glucose at home.  Has lobo- -  not using. Recent A1c 9.8%.Had rxn to [facility]\par Has pos covid Ab's did not know when she had the covid. \par  She denies polyuria, polydipsia, or any visual changes. She too denies any skin lesions, skin breakdown or non-healing areas of skin. She further denies any podiatric concerns. Ophthalmologic evaluation is up to date. A1c values over last several yrs have been in the  mid  to upper 7 range.   She notes occasional notes sone low bg s/s which do remit after bg intake.\par She is on Lisinopril 10 mg and Lipitor 10 mg. The Lisinopril is said to be for renal protection.\par Too, had protein in urine on u/a.  follows with Dr. Chaidez  nephrologist.\par Too , does have hx of fatty liver\par Labs from Dr. Corrales from 5/27 showed mild incr  LFT and A1c of 9.8 with elevated microalbumin- has followed with Dr. Medrano\par Is on metformin and Farxiga\par not taking ozempic\par not sleeping well-now on melatonin 10 mg \par HGM not being carried out\par Diet opw carbs\par tried Lobo\par Lobo came off i nsummer.\par Numbness and tingling geet from back  too present  in hands  has rt cts.\par PMD  Taylor Corrales\par troo, saw ADfr. Ptel rheum\par \par has diffuse rahs arms\par \par A1c 9.3  \par

## 2020-10-21 ENCOUNTER — NON-APPOINTMENT (OUTPATIENT)
Age: 55
End: 2020-10-21

## 2020-10-30 ENCOUNTER — NON-APPOINTMENT (OUTPATIENT)
Age: 55
End: 2020-10-30

## 2020-11-03 LAB
25(OH)D3 SERPL-MCNC: 38.4 NG/ML
ALBUMIN SERPL ELPH-MCNC: 4.9 G/DL
ALP BLD-CCNC: 73 U/L
ALT SERPL-CCNC: 34 U/L
ANION GAP SERPL CALC-SCNC: 16 MMOL/L
AST SERPL-CCNC: 33 U/L
BASOPHILS # BLD AUTO: 0.05 K/UL
BASOPHILS NFR BLD AUTO: 0.6 %
BILIRUB SERPL-MCNC: 0.4 MG/DL
BUN SERPL-MCNC: 15 MG/DL
CALCIUM SERPL-MCNC: 10.4 MG/DL
CHLORIDE SERPL-SCNC: 97 MMOL/L
CHOLEST SERPL-MCNC: 142 MG/DL
CO2 SERPL-SCNC: 24 MMOL/L
CREAT SERPL-MCNC: 0.51 MG/DL
CREAT SPEC-SCNC: 69 MG/DL
EOSINOPHIL # BLD AUTO: 0.18 K/UL
EOSINOPHIL NFR BLD AUTO: 2.2 %
ESTIMATED AVERAGE GLUCOSE: 212 MG/DL
FRUCTOSAMINE SERPL-MCNC: 297 UMOL/L
GLUCOSE BLDC GLUCOMTR-MCNC: 135
GLUCOSE SERPL-MCNC: 143 MG/DL
GLYCOMARK.: 0.6 UG/ML
HBA1C MFR BLD HPLC: 9 %
HBA1C MFR BLD HPLC: 9.3
HCT VFR BLD CALC: 45.3 %
HDLC SERPL-MCNC: 39 MG/DL
HGB BLD-MCNC: 13.9 G/DL
IMM GRANULOCYTES NFR BLD AUTO: 1.4 %
LDLC SERPL DIRECT ASSAY-MCNC: 58 MG/DL
LYMPHOCYTES # BLD AUTO: 2.43 K/UL
LYMPHOCYTES NFR BLD AUTO: 29.2 %
MAN DIFF?: NORMAL
MCHC RBC-ENTMCNC: 27.3 PG
MCHC RBC-ENTMCNC: 30.7 GM/DL
MCV RBC AUTO: 88.8 FL
MICROALBUMIN 24H UR DL<=1MG/L-MCNC: 21.8 MG/DL
MICROALBUMIN/CREAT 24H UR-RTO: 314 MG/G
MONOCYTES # BLD AUTO: 0.81 K/UL
MONOCYTES NFR BLD AUTO: 9.7 %
NEUTROPHILS # BLD AUTO: 4.73 K/UL
NEUTROPHILS NFR BLD AUTO: 56.9 %
PLATELET # BLD AUTO: 285 K/UL
POTASSIUM SERPL-SCNC: 4.8 MMOL/L
PROT SERPL-MCNC: 7.7 G/DL
RBC # BLD: 5.1 M/UL
RBC # FLD: 14.6 %
SODIUM SERPL-SCNC: 137 MMOL/L
T3FREE SERPL-MCNC: 2.84 PG/ML
T4 FREE SERPL-MCNC: 1.1 NG/DL
TRIGL SERPL-MCNC: 324 MG/DL
TSH SERPL-ACNC: 2.9 UIU/ML
WBC # FLD AUTO: 8.32 K/UL

## 2020-12-01 ENCOUNTER — APPOINTMENT (OUTPATIENT)
Dept: ENDOCRINOLOGY | Facility: CLINIC | Age: 55
End: 2020-12-01

## 2020-12-18 ENCOUNTER — APPOINTMENT (OUTPATIENT)
Dept: ENDOCRINOLOGY | Facility: CLINIC | Age: 55
End: 2020-12-18

## 2021-02-28 ENCOUNTER — RESULT REVIEW (OUTPATIENT)
Age: 56
End: 2021-02-28

## 2021-04-21 ENCOUNTER — APPOINTMENT (OUTPATIENT)
Dept: ENDOCRINOLOGY | Facility: CLINIC | Age: 56
End: 2021-04-21
Payer: COMMERCIAL

## 2021-04-21 VITALS
RESPIRATION RATE: 16 BRPM | BODY MASS INDEX: 44.85 KG/M2 | OXYGEN SATURATION: 98 % | TEMPERATURE: 98.6 F | WEIGHT: 293 LBS | SYSTOLIC BLOOD PRESSURE: 122 MMHG | HEART RATE: 105 BPM | DIASTOLIC BLOOD PRESSURE: 78 MMHG

## 2021-04-21 PROCEDURE — 99214 OFFICE O/P EST MOD 30 MIN: CPT

## 2021-04-21 PROCEDURE — 83036 HEMOGLOBIN GLYCOSYLATED A1C: CPT | Mod: QW

## 2021-04-21 PROCEDURE — 99072 ADDL SUPL MATRL&STAF TM PHE: CPT

## 2021-04-21 PROCEDURE — 82962 GLUCOSE BLOOD TEST: CPT

## 2021-04-21 RX ORDER — SEMAGLUTIDE 1.34 MG/ML
2 INJECTION, SOLUTION SUBCUTANEOUS
Qty: 3 | Refills: 3 | Status: DISCONTINUED | COMMUNITY
Start: 2019-08-12 | End: 2021-04-21

## 2021-04-22 LAB
GLUCOSE BLDC GLUCOMTR-MCNC: 161
HBA1C MFR BLD HPLC: 8

## 2021-04-25 NOTE — HISTORY OF PRESENT ILLNESS
[FreeTextEntry1] : Ms. JULIAN is a 55 year old  female who  returns today in follow up with regard to a history of type 2 diabetes mellitus. Patient also presents today for endocrine clearance for bariatric clearance - gastric sleeve mid-May  There is no known history of retinopathy, nephropathy. With regard to neuropathy, she does experiencing tingling in feet. Current dm medications include Farxiga 5 mg QD [c/o bloating], glimepiride 1 mg with breakfast, Metformin  mg [2 tablets with dinner]. Patient checks BG levels at home -- times per day. HGM of late has shown values to be running mid to high 100's with some occasional lower end values. She has had several episodes of hypoglycemia, which resolved after eating. Patient denies any chest pain, sob, neurologic or ophthalmologic complaints. She  too denies any new podiatric concerns. She  is NOT up to date with her ophthalmologic visit, follows with Dr. Johnston. \par POCT glucose returned today at 161 mg/dL\par POCT A1c returned today at 8.0%; previously 9.3% in Sept 2020\par Admits to adequate intake of water. \par \par Additional medical history includes that of fatty liver, hypertension, hyperlipidemia, proteinuria. \par Pt does not show interest in the COVID vaccine.

## 2021-07-14 ENCOUNTER — NON-APPOINTMENT (OUTPATIENT)
Age: 56
End: 2021-07-14

## 2021-07-20 ENCOUNTER — APPOINTMENT (OUTPATIENT)
Dept: ENDOCRINOLOGY | Facility: CLINIC | Age: 56
End: 2021-07-20
Payer: COMMERCIAL

## 2021-07-20 VITALS
BODY MASS INDEX: 41.51 KG/M2 | HEART RATE: 82 BPM | DIASTOLIC BLOOD PRESSURE: 84 MMHG | WEIGHT: 273 LBS | RESPIRATION RATE: 16 BRPM | SYSTOLIC BLOOD PRESSURE: 120 MMHG | OXYGEN SATURATION: 99 %

## 2021-07-20 LAB
GLUCOSE BLDC GLUCOMTR-MCNC: 233
HBA1C MFR BLD HPLC: 8.1

## 2021-07-20 PROCEDURE — 82962 GLUCOSE BLOOD TEST: CPT

## 2021-07-20 PROCEDURE — 99214 OFFICE O/P EST MOD 30 MIN: CPT

## 2021-07-20 PROCEDURE — 83036 HEMOGLOBIN GLYCOSYLATED A1C: CPT | Mod: QW

## 2021-07-20 RX ORDER — GLIMEPIRIDE 1 MG/1
1 TABLET ORAL DAILY
Qty: 90 | Refills: 1 | Status: DISCONTINUED | COMMUNITY
Start: 2020-09-16 | End: 2021-07-20

## 2021-07-25 NOTE — PHYSICAL EXAM
[Alert] : alert [Well Nourished] : well nourished [No Acute Distress] : no acute distress [Well Developed] : well developed [Normal Sclera/Conjunctiva] : normal sclera/conjunctiva [EOMI] : extra ocular movement intact [No Proptosis] : no proptosis [Normal Oropharynx] : the oropharynx was normal [Thyroid Not Enlarged] : the thyroid was not enlarged [No Thyroid Nodules] : no palpable thyroid nodules [No Respiratory Distress] : no respiratory distress [No Accessory Muscle Use] : no accessory muscle use [Clear to Auscultation] : lungs were clear to auscultation bilaterally [Normal S1, S2] : normal S1 and S2 [Normal Rate] : heart rate was normal [Regular Rhythm] : with a regular rhythm [No Edema] : no peripheral edema [Pedal Pulses Normal] : the pedal pulses are present [Normal Bowel Sounds] : normal bowel sounds [Not Tender] : non-tender [Not Distended] : not distended [Soft] : abdomen soft [Normal Anterior Cervical Nodes] : no anterior cervical lymphadenopathy [Normal Posterior Cervical Nodes] : no posterior cervical lymphadenopathy [No Spinal Tenderness] : no spinal tenderness [Spine Straight] : spine straight [No Stigmata of Cushings Syndrome] : no stigmata of Cushings Syndrome [Normal Gait] : normal gait [Normal Strength/Tone] : muscle strength and tone were normal [No Rash] : no rash [No Tremors] : no tremors [Normal Reflexes] : deep tendon reflexes were 2+ and symmetric [Oriented x3] : oriented to person, place, and time [Acanthosis Nigricans] : no acanthosis nigricans

## 2021-07-25 NOTE — HISTORY OF PRESENT ILLNESS
[FreeTextEntry1] : Ms. JULIAN is a 55 year old female who returns today in follow up with regard to a history of type 2 diabetes mellitus. Pt is s/p gastric sleeve on 06/30/2021 at [facility] with Dr. Lemus. Wt 296 lbs on 07/14 - now 271 lbs. Having liquid protein shakes, yogurt, and small pieces of chicken. \par The diabetes has been present for 5 years. There is no known history of retinopathy, nephropathy. With regard to neuropathy, she does experiencing tingling in feet. Current dm medications include Farxiga 10 mg QD, Metformin  mg [1 tablet with dinner]. Off glimepiride. Pt checks BGs minimally. Patient denies any chest pain, sob, neurologic or ophthalmologic complaints. She too denies any new podiatric concerns. She is NOT up to date with her ophthalmologic visit, follows with Dr. Johnston. \par POCT A1c returned today at 8.1 %  \par \par Additional medical history includes that of fatty liver, hypertension, hyperlipidemia, proteinuria. \par Followed with nephrologist Dr. Martinez for proteinuria - Recent labs from last week revealed calcium at 10.8. \par Had been taking OTC calcium supplement and chewables. Off calcium since past 5 days. \par Taking vit B and multi. \par Father hx of parathyroid disorder.

## 2021-09-06 LAB
24R-OH-CALCIDIOL SERPL-MCNC: 83.1 PG/ML
25(OH)D3 SERPL-MCNC: 34.2 NG/ML
ACE BLD-CCNC: 19 U/L
ALBUMIN MFR SERPL ELPH: 58.6 %
ALBUMIN SERPL-MCNC: 4.1 G/DL
ALBUMIN/GLOB SERPL: 1.4 RATIO
ALPHA1 GLOB MFR SERPL ELPH: 4.3 %
ALPHA1 GLOB SERPL ELPH-MCNC: 0.3 G/DL
ALPHA2 GLOB MFR SERPL ELPH: 11.1 %
ALPHA2 GLOB SERPL ELPH-MCNC: 0.8 G/DL
ANION GAP SERPL CALC-SCNC: 15 MMOL/L
B-GLOBULIN MFR SERPL ELPH: 13.2 %
B-GLOBULIN SERPL ELPH-MCNC: 0.9 G/DL
BUN SERPL-MCNC: 19 MG/DL
CA-I SERPL-SCNC: 1.27 MMOL/L
CALCIUM SERPL-MCNC: 10.2 MG/DL
CALCIUM SERPL-MCNC: 10.2 MG/DL
CHLORIDE SERPL-SCNC: 102 MMOL/L
CO2 SERPL-SCNC: 22 MMOL/L
CREAT SERPL-MCNC: 0.52 MG/DL
GAMMA GLOB FLD ELPH-MCNC: 0.9 G/DL
GAMMA GLOB MFR SERPL ELPH: 12.8 %
GLUCOSE SERPL-MCNC: 123 MG/DL
INTERPRETATION SERPL IEP-IMP: NORMAL
M PROTEIN SPEC IFE-MCNC: NORMAL
MAGNESIUM SERPL-MCNC: 2.1 MG/DL
PARATHYROID HORMONE INTACT: 44 PG/ML
PHOSPHATE SERPL-MCNC: 3.8 MG/DL
POTASSIUM SERPL-SCNC: 5.1 MMOL/L
PROT SERPL-MCNC: 7 G/DL
PROT SERPL-MCNC: 7 G/DL
SODIUM SERPL-SCNC: 138 MMOL/L

## 2021-09-08 ENCOUNTER — NON-APPOINTMENT (OUTPATIENT)
Age: 56
End: 2021-09-08

## 2022-04-21 ENCOUNTER — APPOINTMENT (OUTPATIENT)
Dept: ENDOCRINOLOGY | Facility: CLINIC | Age: 57
End: 2022-04-21
Payer: COMMERCIAL

## 2022-04-21 VITALS
TEMPERATURE: 98.1 F | BODY MASS INDEX: 38.65 KG/M2 | WEIGHT: 255 LBS | DIASTOLIC BLOOD PRESSURE: 82 MMHG | SYSTOLIC BLOOD PRESSURE: 123 MMHG | OXYGEN SATURATION: 99 % | HEART RATE: 84 BPM | HEIGHT: 68 IN

## 2022-04-21 DIAGNOSIS — E83.52 HYPERCALCEMIA: ICD-10-CM

## 2022-04-21 DIAGNOSIS — I10 ESSENTIAL (PRIMARY) HYPERTENSION: ICD-10-CM

## 2022-04-21 DIAGNOSIS — E66.01 MORBID (SEVERE) OBESITY DUE TO EXCESS CALORIES: ICD-10-CM

## 2022-04-21 DIAGNOSIS — E11.9 TYPE 2 DIABETES MELLITUS W/OUT COMPLICATIONS: ICD-10-CM

## 2022-04-21 LAB
GLUCOSE BLDC GLUCOMTR-MCNC: 164
HBA1C MFR BLD HPLC: 7.2

## 2022-04-21 PROCEDURE — 76536 US EXAM OF HEAD AND NECK: CPT

## 2022-04-21 PROCEDURE — 99214 OFFICE O/P EST MOD 30 MIN: CPT

## 2022-04-21 PROCEDURE — 83036 HEMOGLOBIN GLYCOSYLATED A1C: CPT | Mod: QW

## 2022-04-21 PROCEDURE — 82962 GLUCOSE BLOOD TEST: CPT

## 2022-04-21 RX ORDER — BLOOD SUGAR DIAGNOSTIC
STRIP MISCELLANEOUS 3 TIMES DAILY
Qty: 3 | Refills: 3 | Status: ACTIVE | COMMUNITY
Start: 2019-08-12 | End: 1900-01-01

## 2022-04-21 RX ORDER — DAPAGLIFLOZIN 10 MG/1
10 TABLET, FILM COATED ORAL
Qty: 90 | Refills: 1 | Status: DISCONTINUED | COMMUNITY
Start: 2020-06-25 | End: 2022-04-21

## 2022-04-21 RX ORDER — LISINOPRIL 20 MG/1
20 TABLET ORAL DAILY
Qty: 30 | Refills: 1 | Status: ACTIVE | COMMUNITY
Start: 2022-04-21

## 2022-04-21 NOTE — HISTORY OF PRESENT ILLNESS
[FreeTextEntry1] : Ms. JULIAN is a 56 year old  female who  returns today in follow up with regard to a history of type 2 diabetes mellitus She did undergo gastric sleeve procedure in 6/30/2021 with Dr. Banda. Weight down from 306 lbs to 255 lbs. There is no known history of retinopathy, nephropathy. With regard to neuropathy, she does experiencing tingling in feet.\par \par  Current dm medications include Metformin  mg [2 tablets with dinner]. Patient checks BG levels at home  times per day. HGM of late has not been carried out to any great extent. Notes occasional hypoglycemic symptoms. No fever, rash, or recent illness.  No joint pain/swelling/stiffness.  No eye pain/redness/change in vision.  No sores in the mouth or nose.  No difficulty swallowing.  No chest pain or shortness of breath.  No weakness.  No headaches or focal neurological deficits.  No urinary changes.  No other new symptoms Patient denies any chest pain, sob, neurologic or ophthalmologic complaints. She  too denies any new podiatric concerns. She  is NOT up to date with her ophthalmologic visit, follows with Dr. Johnston. \par POCT glucose returned today at 164 mg/dL (less than 2 hrs post prandial- did eat oatmeal)\par POCT A1c returned today at 7.2% ; previously 8.1% from 7/20/2021\par  \par \par Additional medical history includes that of fatty liver, hypertension, hyperlipidemia, proteinuria. \par Taking Atorvastatin 10 mg and Lisinopril 20 mg. Taking Zinc, vitamin C, calcium, biotin

## 2022-04-22 LAB
24R-OH-CALCIDIOL SERPL-MCNC: 39.6 PG/ML
25(OH)D3 SERPL-MCNC: 28.8 NG/ML
ALBUMIN SERPL ELPH-MCNC: 4.5 G/DL
ALP BLD-CCNC: 78 U/L
ALT SERPL-CCNC: 19 U/L
ANION GAP SERPL CALC-SCNC: 15 MMOL/L
AST SERPL-CCNC: 23 U/L
BILIRUB SERPL-MCNC: 0.5 MG/DL
BUN SERPL-MCNC: 12 MG/DL
CA-I SERPL-SCNC: 5.3 MG/DL
CALCIUM SERPL-MCNC: 10.5 MG/DL
CALCIUM SERPL-MCNC: 10.5 MG/DL
CHLORIDE SERPL-SCNC: 98 MMOL/L
CHOLEST SERPL-MCNC: 133 MG/DL
CO2 SERPL-SCNC: 24 MMOL/L
CREAT SERPL-MCNC: 0.43 MG/DL
CREAT SPEC-SCNC: 40 MG/DL
EGFR: 114 ML/MIN/1.73M2
ESTIMATED AVERAGE GLUCOSE: 169 MG/DL
FRUCTOSAMINE SERPL-MCNC: 264 UMOL/L
GLUCOSE SERPL-MCNC: 139 MG/DL
GLYCOMARK.: 3.7 UG/ML
HBA1C MFR BLD HPLC: 7.5 %
HDLC SERPL-MCNC: 46 MG/DL
LDLC SERPL DIRECT ASSAY-MCNC: 50 MG/DL
MAGNESIUM SERPL-MCNC: 1.7 MG/DL
MICROALBUMIN 24H UR DL<=1MG/L-MCNC: 6.5 MG/DL
MICROALBUMIN/CREAT 24H UR-RTO: 163 MG/G
PARATHYROID HORMONE INTACT: 38 PG/ML
PHOSPHATE SERPL-MCNC: 3.9 MG/DL
POTASSIUM SERPL-SCNC: 4.9 MMOL/L
PROT SERPL-MCNC: 7.4 G/DL
SODIUM SERPL-SCNC: 137 MMOL/L
T3FREE SERPL-MCNC: 3.26 PG/ML
T4 FREE SERPL-MCNC: 1.2 NG/DL
TRIGL SERPL-MCNC: 236 MG/DL
TSH SERPL-ACNC: 2.23 UIU/ML

## 2022-06-27 ENCOUNTER — RESULT REVIEW (OUTPATIENT)
Age: 57
End: 2022-06-27

## 2023-02-06 NOTE — ED ADULT TRIAGE NOTE - PAIN: PRESENCE, MLM
complains of pain/discomfort Retention Suture Text: Retention sutures were placed to support the closure and prevent dehiscence.

## 2023-05-31 ENCOUNTER — NON-APPOINTMENT (OUTPATIENT)
Age: 58
End: 2023-05-31

## 2023-06-16 ENCOUNTER — NON-APPOINTMENT (OUTPATIENT)
Age: 58
End: 2023-06-16

## 2023-09-05 ENCOUNTER — APPOINTMENT (OUTPATIENT)
Dept: ENDOCRINOLOGY | Facility: CLINIC | Age: 58
End: 2023-09-05

## 2023-11-13 RX ORDER — METFORMIN ER 750 MG 750 MG/1
750 TABLET ORAL DAILY
Qty: 60 | Refills: 0 | Status: ACTIVE | COMMUNITY
Start: 2019-08-12 | End: 1900-01-01

## 2023-12-18 ENCOUNTER — APPOINTMENT (OUTPATIENT)
Dept: PODIATRY | Facility: CLINIC | Age: 58
End: 2023-12-18
Payer: COMMERCIAL

## 2023-12-18 DIAGNOSIS — L85.1 ACQUIRED KERATOSIS [KERATODERMA] PALMARIS ET PLANTARIS: ICD-10-CM

## 2023-12-18 DIAGNOSIS — M21.41 FLAT FOOT [PES PLANUS] (ACQUIRED), RIGHT FOOT: ICD-10-CM

## 2023-12-18 DIAGNOSIS — E11.49 TYPE 2 DIABETES MELLITUS WITH OTHER DIABETIC NEUROLOGICAL COMPLICATION: ICD-10-CM

## 2023-12-18 DIAGNOSIS — B35.1 TINEA UNGUIUM: ICD-10-CM

## 2023-12-18 DIAGNOSIS — M21.42 FLAT FOOT [PES PLANUS] (ACQUIRED), RIGHT FOOT: ICD-10-CM

## 2023-12-18 PROCEDURE — 11720 DEBRIDE NAIL 1-5: CPT | Mod: 59

## 2023-12-18 PROCEDURE — 11055 PARING/CUTG B9 HYPRKER LES 1: CPT

## 2023-12-18 PROCEDURE — 99203 OFFICE O/P NEW LOW 30 MIN: CPT | Mod: 25

## 2023-12-18 PROCEDURE — 73630 X-RAY EXAM OF FOOT: CPT | Mod: RT

## 2023-12-18 RX ORDER — CICLOPIROX 80 MG/ML
8 SOLUTION TOPICAL
Qty: 6.6 | Refills: 3 | Status: ACTIVE | COMMUNITY
Start: 2023-12-18 | End: 1900-01-01

## 2023-12-19 PROBLEM — B35.1 ONYCHOMYCOSIS: Status: ACTIVE | Noted: 2023-12-18

## 2023-12-20 PROBLEM — L85.1 KERATODERMA, ACQUIRED: Status: ACTIVE | Noted: 2023-12-19

## 2023-12-20 PROBLEM — M21.41 PES PLANUS OF BOTH FEET: Status: ACTIVE | Noted: 2023-12-19

## 2023-12-20 PROBLEM — E11.49 DM (DIABETES MELLITUS), TYPE 2 WITH NEUROLOGICAL COMPLICATIONS: Status: ACTIVE | Noted: 2023-12-19

## 2023-12-20 NOTE — PROCEDURE
[FreeTextEntry1] : X-rays were taken to evaluate for fracture or bony irregularities. X-ray Report: (Bilateral foot - 3 views)  I got x-rays that demonstrate arthritic hammertoes with arthritic midfoot and flatfoot. Minor osteopenia. No signs of fracture.

## 2023-12-20 NOTE — HISTORY OF PRESENT ILLNESS
[Sneakers] : oma [FreeTextEntry1] : Patient present today for evaluation. She has neuropathy and she has fallen. She does have diabetes and her A1c is 7.2. She also has deformed nails that are getting worse.  She notices increasing numbness.

## 2023-12-20 NOTE — PHYSICAL EXAM
[2+] : left foot dorsalis pedis 2+ [Vibration Dec.] : diminished vibratory sensation at the level of the toes [Position Sense Dec.] : diminished position sense at the level of the toes [Diminished Throughout Right Foot] : diminished sensation with monofilament testing throughout right foot [Diminished Throughout Left Foot] : diminished sensation with monofilament testing throughout left foot [Ankle Swelling (On Exam)] : not present [Varicose Veins Of Lower Extremities] : not present [Delayed in the Right Toes] : capillary refills normal in right toes [Delayed in the Left Toes] : capillary refills normal in the left toes [FreeTextEntry3] : Hair growth noted on digits. Proximal to distal cooling is within normal limits.  [de-identified] : She has an adult-acquired flatfoot. She has intrinsic atrophy and semi-rigid 2nd arthritic hammertoes bilateral. [FreeTextEntry1] : She has a preulcerative lesion at the tip of the left 2nd toe. She has onychomycosis in nails 1 and 2 bilateral. They are yellow, thick, brittle with subungual debris and mycosis. They are discolored, deformed and painful despite neuropathy.

## 2023-12-20 NOTE — ASSESSMENT
[FreeTextEntry1] : Impression: Flatfoot. Diabetic neuropathy. Onychomycosis. Preulcerative keratotic lesion.   Treatment: I want her to take B12. I discussed shoe gear. I measured her foot. Her shoes are too small so I discussed the appropriate shoe and what shoes to get. I trimmed keratotic lesion that is preulcerative. I aggressively debrided and debulked mycotic nails without incident.  She will inspect the foot daily. I wrote for Ciclopirox to treat the fungus infection in the nail. She will follow-up in the office for evaluation.

## 2024-03-19 ENCOUNTER — APPOINTMENT (OUTPATIENT)
Dept: ENDOCRINOLOGY | Facility: CLINIC | Age: 59
End: 2024-03-19

## 2024-03-19 NOTE — HISTORY OF PRESENT ILLNESS
[FreeTextEntry1] : Ms. JULIAN is a 58 year old  female who returns today in follow up with regard to a history of type 2 diabetes mellitus She did undergo gastric sleeve procedure in May of 2021. There is no known history of retinopathy, nephropathy. With regard to neuropathy, she does experience tingling in feet.   Current dm medications include, Metformin  mg [2 tablets with dinner]. Patient checks BG levels at home  times per day. HGM of late has shown values to be running             No joint pain/swelling/stiffness.  No eye pain/redness/change in vision.  No sores in the mouth or nose.  No difficulty swallowing.  No chest pain or shortness of breath.  No abdominal complaints or weight loss.  No weakness.  No headaches or focal neurological deficits.  No urinary changes.  No other new symptoms Patient denies any chest pain, sob, neurologic or ophthalmologic complaints. She  too denies any new podiatric concerns. She is NOT up to date with her ophthalmologic visit, follows with Dr. Johnston.   POCT glucose returned today at  POCT A1c returned today at     Additional medical history includes that of fatty liver, hypertension, hyperlipidemia, proteinuria.  Medications: Atorvastatin 10 mg, Lisinopril 20 mg